# Patient Record
Sex: FEMALE | Race: WHITE | NOT HISPANIC OR LATINO | URBAN - METROPOLITAN AREA
[De-identification: names, ages, dates, MRNs, and addresses within clinical notes are randomized per-mention and may not be internally consistent; named-entity substitution may affect disease eponyms.]

---

## 2018-08-08 ENCOUNTER — LAB REQUISITION (OUTPATIENT)
Dept: LAB | Facility: HOSPITAL | Age: 63
End: 2018-08-08

## 2018-08-08 DIAGNOSIS — Z00.00 ROUTINE GENERAL MEDICAL EXAMINATION AT A HEALTH CARE FACILITY: ICD-10-CM

## 2018-08-08 LAB — TROPONIN I SERPL-MCNC: <0.006 NG/ML

## 2018-08-08 PROCEDURE — 84484 ASSAY OF TROPONIN QUANT: CPT | Performed by: INTERNAL MEDICINE

## 2022-02-17 ENCOUNTER — TRANSCRIBE ORDERS (OUTPATIENT)
Dept: ADMINISTRATIVE | Facility: HOSPITAL | Age: 67
End: 2022-02-17

## 2022-02-17 DIAGNOSIS — I25.10 CAD IN NATIVE ARTERY: Primary | ICD-10-CM

## 2022-04-06 ENCOUNTER — APPOINTMENT (OUTPATIENT)
Dept: CT IMAGING | Facility: HOSPITAL | Age: 67
End: 2022-04-06

## 2023-02-27 ENCOUNTER — DOCUMENTATION (OUTPATIENT)
Dept: FAMILY MEDICINE CLINIC | Facility: CLINIC | Age: 68
End: 2023-02-27
Payer: COMMERCIAL

## 2023-02-27 PROBLEM — G92.8 TOXIC METABOLIC ENCEPHALOPATHY: Status: ACTIVE | Noted: 2023-02-27

## 2023-02-27 PROBLEM — S82.852A LEFT TRIMALLEOLAR FRACTURE: Status: ACTIVE | Noted: 2023-02-27

## 2023-02-27 PROBLEM — E66.01 MORBID (SEVERE) OBESITY DUE TO EXCESS CALORIES (HCC): Status: ACTIVE | Noted: 2023-02-27

## 2023-02-27 PROBLEM — F39 MOOD DISORDER (HCC): Status: ACTIVE | Noted: 2023-02-27

## 2023-02-27 PROBLEM — S62.221A: Status: ACTIVE | Noted: 2023-02-27

## 2023-02-27 RX ORDER — GABAPENTIN 300 MG/1
300 CAPSULE ORAL 3 TIMES DAILY
COMMUNITY
End: 2023-02-27 | Stop reason: SDUPTHER

## 2023-02-27 RX ORDER — HYDROCODONE BITARTRATE AND ACETAMINOPHEN 5; 325 MG/1; MG/1
1 TABLET ORAL EVERY 8 HOURS PRN
COMMUNITY
End: 2023-02-28 | Stop reason: SDUPTHER

## 2023-02-27 RX ORDER — METHYLPHENIDATE HYDROCHLORIDE EXTENDED RELEASE 10 MG/1
20 TABLET ORAL 3 TIMES DAILY
COMMUNITY
End: 2023-02-27 | Stop reason: SDUPTHER

## 2023-02-27 RX ORDER — BUPRENORPHINE HYDROCHLORIDE AND NALOXONE HYDROCHLORIDE DIHYDRATE 2; .5 MG/1; MG/1
3 TABLET SUBLINGUAL DAILY
COMMUNITY

## 2023-02-27 RX ORDER — GABAPENTIN 300 MG/1
300 CAPSULE ORAL 3 TIMES DAILY
Qty: 45 CAPSULE | Refills: 1 | Status: SHIPPED | OUTPATIENT
Start: 2023-02-27 | End: 2023-03-14 | Stop reason: SDUPTHER

## 2023-02-27 RX ORDER — CLONAZEPAM 0.5 MG/1
0.5 TABLET ORAL 2 TIMES DAILY PRN
COMMUNITY
End: 2023-02-28 | Stop reason: SDUPTHER

## 2023-02-27 RX ORDER — METHYLPHENIDATE HYDROCHLORIDE EXTENDED RELEASE 10 MG/1
20 TABLET ORAL 3 TIMES DAILY
Qty: 90 TABLET | Refills: 0 | Status: SHIPPED | OUTPATIENT
Start: 2023-02-27

## 2023-02-27 NOTE — TELEPHONE ENCOUNTER
(NEW ADMIT)    [facility] REQUESTING MED REFILLS FOR METHYLPHENIDATE ER 10 MG AND GABAPENTIN 300 MG.    DIRECTIONS: METHYLPHENIDATE ER 10 MG TAKE 2 TABS = TO 20 MG PO TID BEFORE MEALS.    GABAPENTIN 300 MG 1 CAP PO TID SCHEDULED.

## 2023-02-28 RX ORDER — CLONAZEPAM 0.5 MG/1
0.5 TABLET ORAL 2 TIMES DAILY PRN
Qty: 30 TABLET | Refills: 1 | Status: SHIPPED | OUTPATIENT
Start: 2023-02-28 | End: 2023-03-14 | Stop reason: SDUPTHER

## 2023-02-28 RX ORDER — HYDROCODONE BITARTRATE AND ACETAMINOPHEN 5; 325 MG/1; MG/1
1 TABLET ORAL EVERY 8 HOURS PRN
Qty: 45 TABLET | Refills: 0 | Status: SHIPPED | OUTPATIENT
Start: 2023-02-28 | End: 2023-03-14 | Stop reason: SDUPTHER

## 2023-02-28 NOTE — TELEPHONE ENCOUNTER
(NEW ADMIT)    [facility] REQUESTING MED REFILLS FOR CLONAZEPAM 0.5 MG AND NORCO 5-325 MG.    DIRECTIONS: CLONAZEPAM 0.5 MG 1 TAB PO BID PRN.    NORCO 5-325 MG 1 TAB PO Q 8 HRS PRN.

## 2023-03-01 ENCOUNTER — TELEPHONE (OUTPATIENT)
Dept: INTERNAL MEDICINE | Facility: CLINIC | Age: 68
End: 2023-03-01
Payer: COMMERCIAL

## 2023-03-01 RX ORDER — METHYLPHENIDATE HYDROCHLORIDE 10 MG/1
10 TABLET ORAL 2 TIMES DAILY
Qty: 60 TABLET | Refills: 0 | Status: SHIPPED | OUTPATIENT
Start: 2023-03-01 | End: 2023-03-02

## 2023-03-01 RX ORDER — METHYLPHENIDATE HYDROCHLORIDE EXTENDED RELEASE 10 MG/1
20 TABLET ORAL 3 TIMES DAILY
Qty: 90 TABLET | Refills: 0 | Status: CANCELLED | OUTPATIENT
Start: 2023-03-01

## 2023-03-01 NOTE — TELEPHONE ENCOUNTER
SPOKE WITH SEAN AT [facility], ORDER IN [facility] METHYLPHENIDATE 10 MG ER. INSTRUCTED SEAN THAT ORDER WOULD HAVE TO BE CLARIFIED PER [facility] BECAUSE THEY HAVE IT IN THEIR SYSTEM AS ER.

## 2023-03-01 NOTE — TELEPHONE ENCOUNTER
(NEW ADMIT)    [facility] REQUESTING MED REFILL FOR METHYLPHENIDATE 10 MG.    DIRECTIONS: METHYLPHENIDATE 10 MG 2 TABS (20 MG) PO THREE TIMES A DAY BEFORE MEALS.

## 2023-03-01 NOTE — TELEPHONE ENCOUNTER
Caller: [place], [place] - [address] [address] - [phone] [facility] [phone] FX    Relationship: Pharmacy, SEAN Hassan call back number: [phone]    Requested Prescriptions:   Requested Prescriptions     Pending Prescriptions Disp Refills   • methylphenidate ER (METADATE ER) 10 MG CR tablet 90 tablet 0     Sig: Take 2 tablets by mouth 3 (Three) Times a Day. 2 TABS = TO 20 MG PO BEFORE MEALS        Pharmacy where request should be sent: [place] - [address] - [phone] [facility] [phone] FX     Additional details provided by patient: INSTRUCTED TO CALL [facility] FOR NURSING HOME INQUIRIES- [facility] INSTRUCTED TO CALL THIS OFFICE.   RECEIVED A PRESCRIPTION FOR ER NOT THE REGULAR RELEASE.  ASKING WHICH TYPE OF MEDICATION SHOULD BE ORDERED. PLEASE CALL TO CONFIRM THE MEDICATION TYPE ER VS REGULAR RELEASE.    PLEASE CALL     Elena Rehman Rep   03/01/23 14:01 EST

## 2023-03-02 ENCOUNTER — NURSING HOME (OUTPATIENT)
Dept: INTERNAL MEDICINE | Facility: CLINIC | Age: 68
End: 2023-03-02
Payer: COMMERCIAL

## 2023-03-02 VITALS
OXYGEN SATURATION: 97 % | WEIGHT: 293 LBS | SYSTOLIC BLOOD PRESSURE: 113 MMHG | DIASTOLIC BLOOD PRESSURE: 72 MMHG | HEART RATE: 74 BPM | RESPIRATION RATE: 18 BRPM | TEMPERATURE: 97.4 F

## 2023-03-02 DIAGNOSIS — E66.01 MORBID (SEVERE) OBESITY DUE TO EXCESS CALORIES: ICD-10-CM

## 2023-03-02 DIAGNOSIS — F39 MOOD DISORDER: ICD-10-CM

## 2023-03-02 DIAGNOSIS — G92.8 TOXIC METABOLIC ENCEPHALOPATHY: Primary | ICD-10-CM

## 2023-03-02 DIAGNOSIS — S62.221D CLOSED DISPLACED ROLANDO'S FRACTURE OF RIGHT THUMB WITH ROUTINE HEALING, SUBSEQUENT ENCOUNTER: ICD-10-CM

## 2023-03-02 DIAGNOSIS — F90.9 ATTENTION DEFICIT HYPERACTIVITY DISORDER (ADHD), UNSPECIFIED ADHD TYPE: ICD-10-CM

## 2023-03-02 DIAGNOSIS — S82.852A CLOSED TRIMALLEOLAR FRACTURE OF LEFT ANKLE, INITIAL ENCOUNTER: ICD-10-CM

## 2023-03-02 PROCEDURE — 99305 1ST NF CARE MODERATE MDM 35: CPT | Performed by: INTERNAL MEDICINE

## 2023-03-02 RX ORDER — METHYLPHENIDATE HYDROCHLORIDE 10 MG/1
20 TABLET ORAL 3 TIMES DAILY
Qty: 120 TABLET | Refills: 0 | Status: SHIPPED | OUTPATIENT
Start: 2023-03-02 | End: 2023-03-14 | Stop reason: SDUPTHER

## 2023-03-10 NOTE — PROGRESS NOTES
Nursing Home History and Physical       Anthony Perez DO  [address] [place] [address] Phone: [phone]  Fax: [phone]     PATIENT NAME: Nataliia Encarnacion                                                                          YOB: 1955           DATE OF SERVICE: 03/02/2023  FACILITY:  [facility]    CHIEF COMPLAINT:  Nursing facility admission      HISTORY OF PRESENT ILLNESS:   Patient is a 67-year-old female with a history of atrial fibrillation, hypertension, hyperlipidemia, coronary artery disease, ADHD, mood disorder, obesity, and opioid use disorder who was recently hospitalized at  for polytrauma resulting in left lower extremity and right thumb fractures followed by additional falls and weakness due to UTI.  Patient was treated at  with orthopedic services with right thumb ORIF and Ex-Fix for trimalleolar fracture.  Due to weakness and debility, she was transferred to [facility] for strengthening and rehab.    On exam today, patient appeared comfortable in her bed.  She is looking forward to seeing orthopedics for evaluation of the thumb.  She was content with her current medication regimen.  Mood and behaviors have been stable since admission to this facility.        PAST MEDICAL & SURGICAL HISTORY:   Past Medical History:   Diagnosis Date   • ADHD    • Coronary artery disease    • Fall    • Hyperlipidemia    • Hypertension    • Mood disorder (HCC)    • Obesity    • Opiate abuse, continuous (HCC)    • Tobacco abuse    • Urinary tract infection       Past Surgical History:   Procedure Laterality Date   • INTERTROCHANTERIC HIP FRACTURE SURGERY N/A          MEDICATIONS:  I have reviewed and reconciled the patients medication list in the patients chart at the skilled nursing facility on 03/02/2023.      ALLERGIES:  Not on File      SOCIAL HISTORY:  Social History     Socioeconomic History   • Marital status: Unknown   Tobacco Use   • Smoking status: Former     Packs/day: 2.00      Types: Cigarettes   Vaping Use   • Vaping Use: Every day   Substance and Sexual Activity   • Alcohol use: Never   • Drug use: Yes     Types: Codeine, Oxycodone, Hydrocodone     Comment: OPIIATE ABUSE   • Sexual activity: Defer       FAMILY HISTORY:  Family History   Family history unknown: Yes        REVIEW OF SYSTEMS:  Review of Systems      PHYSICAL EXAMINATION:   VITAL SIGNS: /72   Pulse 74   Temp 97.4 °F (36.3 °C)   Resp 18   Wt (!) 138 kg (304 lb)   SpO2 97%     Physical Exam    RECORDS REVIEW:   Discharge Summary from [facility] 2/24/2023    ASSESSMENT   Diagnoses and all orders for this visit:    1. Toxic metabolic encephalopathy (Primary)    2. Closed trimalleolar fracture of left ankle, initial encounter    3. Morbid (severe) obesity due to excess calories (HCC)    4. Closed displaced Gurmeet's fracture of right thumb with routine healing, subsequent encounter    5. Mood disorder (HCC)    6. Attention deficit hyperactivity disorder (ADHD), unspecified ADHD type    Other orders  -     methylphenidate (Ritalin) 10 MG tablet; Take 2 tablets by mouth 3 (Three) Times a Day.  Dispense: 120 tablet; Refill: 0        PLAN    Left ankle trimalleolar fracture  - Status post CR and Ex-Fix on 1/21 with Dr Cheatham  - Nonweightbearing to left lower extremity.  Continue keeping extremity in cam boot except for range of motion exercises and for showering.  - DVT prophylaxis with Xarelto (for A-fib)  - Stable pain control with current medication regimen.    Right thumb first MCP Guera fracture  - Status post ORIF 1/21/2023  - Right upper extremity weightbearing as tolerated through elbow with thumb splint  - Follow-up with Ortho hand on 3/2/2023 3:40 PM    Opioid use disorder  - Patient started on Suboxone and will continue with 6 mg daily  - Dr. Childress will continue to manage pain medications.    Paroxysmal atrial fibrillation  - Continue Xarelto and metoprolol    Hypertension  - Stable on current cardiac  medications.    Hyperlipidemia  - Continue atorvastatin    Coronary artery disease  - Continue Plavix and metoprolol    Mood disorder  - Continue fluoxetine and olanzapine.  Clonazepam continued at decreased dose of 0.5 mg p.o. twice daily as needed    Tobacco use disorder  - Continue nicotine replacement    ADHD  - Continue home Ritalin    Morbid obesity  - BMI 49 which complicates aspects of care and recovery time.    Urinary tract infection  - Completed recent treatment with ciprofloxacin        [x]  Discussed Patient in detail with nursing/staff, addressed all needs today.     [x]  Plan of Care Reviewed   [x]  PT/OT Reviewed   []  Order Changes  []  Discharge Plans Reviewed  [x]  Advance Directive on file with Nursing Home.   [x]  POA on file with Nursing Home.    [x]  Code Status listed and reviewed.       Anthony Perez DO.  3/9/2023      **Part of this note may be an electronic transcription/translation of spoken language to printed text using the Dragon Dictation System.**

## 2023-03-14 RX ORDER — HYDROCODONE BITARTRATE AND ACETAMINOPHEN 5; 325 MG/1; MG/1
1 TABLET ORAL EVERY 8 HOURS PRN
Qty: 45 TABLET | Refills: 0 | Status: SHIPPED | OUTPATIENT
Start: 2023-03-14 | End: 2023-03-14 | Stop reason: SDUPTHER

## 2023-03-14 RX ORDER — GABAPENTIN 300 MG/1
300 CAPSULE ORAL 3 TIMES DAILY
Qty: 45 CAPSULE | Refills: 1 | Status: SHIPPED | OUTPATIENT
Start: 2023-03-14 | End: 2023-03-14 | Stop reason: SDUPTHER

## 2023-03-14 NOTE — TELEPHONE ENCOUNTER
(DISCHARGE SCRIPTS)    [facility] REQUESTING MED REFILLS ON METHYLPHENIDATE HCL 10 MG, GABAPENTIN 300 MG, CLONAZEPAM 0.5, AND NORCO 5-325 MG.    DIRECTIONS: METHYLPHENIDATE HCL 10 MG 2 TABS PO BEFORE MEALS.    GABAPENTIN 300 MG 1 CAP PO Q 8 HRS.    CLONAZEPAM 0.5 1 TAB PO BID PRN.    NORCO 5-325 MG 1 TAB PO Q 8 HRS PRN.

## 2023-03-14 NOTE — TELEPHONE ENCOUNTER
[facility] REQUESTING MED REFILL FOR NORCO 5-325 MG AND GABAPENTIN 300 MG.    DIRECTIONS: NORCO  5-325 MG 1 TAB PO Q 8 HRS PRN.    GABAPENTIN 300 MG 1 CAP PO TID SCHEDULED.

## 2023-03-15 ENCOUNTER — TELEPHONE (OUTPATIENT)
Dept: INTERNAL MEDICINE | Facility: CLINIC | Age: 68
End: 2023-03-15

## 2023-03-15 RX ORDER — METHYLPHENIDATE HYDROCHLORIDE 10 MG/1
20 TABLET ORAL 3 TIMES DAILY
Qty: 20 TABLET | Refills: 0 | Status: SHIPPED | OUTPATIENT
Start: 2023-03-15

## 2023-03-15 RX ORDER — CLONAZEPAM 0.5 MG/1
0.5 TABLET ORAL 2 TIMES DAILY PRN
Qty: 20 TABLET | Refills: 0 | Status: SHIPPED | OUTPATIENT
Start: 2023-03-15

## 2023-03-15 RX ORDER — HYDROCODONE BITARTRATE AND ACETAMINOPHEN 5; 325 MG/1; MG/1
1 TABLET ORAL EVERY 8 HOURS PRN
Qty: 20 TABLET | Refills: 0 | Status: SHIPPED | OUTPATIENT
Start: 2023-03-15

## 2023-03-15 RX ORDER — GABAPENTIN 300 MG/1
300 CAPSULE ORAL 3 TIMES DAILY
Qty: 20 CAPSULE | Refills: 0 | Status: SHIPPED | OUTPATIENT
Start: 2023-03-15

## 2023-03-15 NOTE — TELEPHONE ENCOUNTER
Caller: Nataliia Encarnacion    Relationship: Self    Best call back number:[phone]  Requested Prescriptions:   HYDROCODONE 7.5MG IF POSSIBLE     Pharmacy where request should be sent: [facility]/PHARMACY #[id number] - [address] [phone] [facility] [phone]      Additional details provided by patient: PHARMACY DOES NOT HAVE 5MG AND DOES NOT KNOW WHEN THEY WILL. OTHER PHARMACIES DO NOT HAVE IT EITHER. ANY QUESTIONS PLEASE CALL PATIENT TO DISCUSS OR IF THIS IS NOT POSSIBLE     Does the patient have less than a 3 day supply:  [x] Yes  [] No    Would you like a call back once the refill request has been completed: [] Yes [x] No    If the office needs to give you a call back, can they leave a voicemail: [] Yes [x] No    Elena Light Rep   03/15/23 15:58 EDT

## 2023-03-16 NOTE — TELEPHONE ENCOUNTER
PLEASE ADVISE. [facility] WAS CONTACTED AND THEY STATED THAT THEY COULD NOT GET NORCO 5-325 MG AT THIS TIME. THEY DO HAVE NORCO 7.5-325 MG IN STOCK.